# Patient Record
Sex: MALE | Race: WHITE | NOT HISPANIC OR LATINO | URBAN - METROPOLITAN AREA
[De-identification: names, ages, dates, MRNs, and addresses within clinical notes are randomized per-mention and may not be internally consistent; named-entity substitution may affect disease eponyms.]

---

## 2017-09-30 ENCOUNTER — OFFICE VISIT (OUTPATIENT)
Dept: URGENT CARE | Facility: CLINIC | Age: 27
End: 2017-09-30

## 2017-09-30 VITALS
TEMPERATURE: 97 F | OXYGEN SATURATION: 99 % | RESPIRATION RATE: 12 BRPM | HEART RATE: 68 BPM | BODY MASS INDEX: 24.87 KG/M2 | SYSTOLIC BLOOD PRESSURE: 137 MMHG | WEIGHT: 200 LBS | HEIGHT: 75 IN | DIASTOLIC BLOOD PRESSURE: 83 MMHG

## 2017-09-30 DIAGNOSIS — J02.9 SORE THROAT: Primary | ICD-10-CM

## 2017-09-30 LAB
CTP QC/QA: YES
S PYO RRNA THROAT QL PROBE: NEGATIVE

## 2017-09-30 PROCEDURE — 3008F BODY MASS INDEX DOCD: CPT | Mod: S$GLB,,, | Performed by: FAMILY MEDICINE

## 2017-09-30 PROCEDURE — 87880 STREP A ASSAY W/OPTIC: CPT | Mod: QW,S$GLB,, | Performed by: FAMILY MEDICINE

## 2017-09-30 PROCEDURE — 99202 OFFICE O/P NEW SF 15 MIN: CPT | Mod: S$GLB,,, | Performed by: FAMILY MEDICINE

## 2017-09-30 RX ORDER — BENZONATATE 100 MG/1
100 CAPSULE ORAL EVERY 6 HOURS PRN
Qty: 30 CAPSULE | Refills: 1 | Status: SHIPPED | OUTPATIENT
Start: 2017-09-30 | End: 2018-09-30

## 2017-09-30 NOTE — PATIENT INSTRUCTIONS
Pharyngite virale (mal de gorge)    Vous (ou votre enfant, si le patient est votre enfant) avez une pharyngite (un mal de gorge). Cette infection est causée par un virus. Birgit peut causer une douleur à la gorge vidya est plus intense quand vous avalez, tim courbatures partout, un mal de tête, et de la fièvre. L'infection peut se répandre par la toux, par les baisers, ou en touchant d'autres personnes après avoir touché votre bouche ou votre grant. Les antibiotiques sont sans effets contre les virus, donc ils ne sont pas utilisés pour traiter cette maladie.  Soins à domicile  · Si marvin symptômes sont sévères, reposez-vous chez vous. Retournez au travail ou à l'école quand vous sentez suffisamment rizwana.   · Buvez aussi en grandes quantités pour éviter la déshydratation.  · Pour les enfants : Utilisez un acétaminophène pour la fièvre, le comportement difficile ou l'inconfort. Avec les nouveau-nés âgés de plus de 6 mois, vous pouvez utiliser de l'ibuprofène au lieu de l'acétaminophène. (REMARQUE : Si votre enfant a une maladie chronique du foie ou du rein, ou s'il a déjà eu un ulcère à l'estomac ou un saignement gastro-intestinal, parlez avec votre médecin zach d'utiliser dave médicaments.) (REMARQUE : Ne jamais izaiah d'aspirine à une personne âgée de moins de 18 ans vidya est malade et vidya a de la fièvre. Rober pourrait causer tim dommages graves au foie.)   · Pour les adultes : Vous pouvez utiliser de l'acétaminophène ou de l'ibuprofène pour contrôler la douleur et la fièvre, à moins qu'un autre médicament ait été prescrit pour rober. (REMARQUE : si vous avez une maladie chronique du foie ou tim reins, ou si vous avez déjà eu un ulcère à l'estomac ou un saignement gastro-intestinal, parlez avec votre médecin zach de prendre dave médicaments.)  · Les pastilles pour la gorge ou les sprays anesthésiques pour la gorge peuvent aider à soulager la douleur. Le fait de vous gargariser avec de l'eau chaude salée vous aidera  aussi à réduire la douleur dans votre gorge. Pour sai, faites fondre 1/2 cuillère à thé de tan dans un verre d'eau chaude. Pour apaiser un mal de gorge, les enfants peuvent boire un jus à petites gorgées ou manger une glace à l'eau. Les enfants âgés de 5 ans ou plus peuvent également manger un suçon ou un bonbon dur.  · Évitez les aliments salés ou épicés, vidya peuvent être irritants pour la gorge.  Soins de suivi  Réalisez le suivi avec votre fournisseur de soins de santé ou notre personnel si votre état ne s'améliore pas au cours de la semaine suivante.  Quand devez-vous chercher à obtenir un sotero médical  Appelez votre fournisseur de soins de santé immédiatement si ce vidya suit se produit :  · Degré de fièvre indiqué par votre médecin.  Pour les enfants, obtenez tim soins :  ¨ Votre enfant de tout âge a tim fièvres à répétition supérieures à 104°F (40°C).  ¨ Votre enfant a moins de 2 ans et a une fièvre de 100,4 °F (38 °C) vidya perdure pendant plus d'un jour.  ¨ Votre enfant a 2 ans ou plus et a une fièvre de 100,4 °F (38 °C) vidya perdure pendant plus de 3 jours.  · Douleur à l'oreille, au sinus ou migraine nouvelle ou vidya s'aggrave  · Bosses douloureuses dans la nuque  · Raideur dans le cou  · Ganglions lymphatiques vidya augmentent de volume  · Incapacité à avaler tim liquides, bave excessive, ou incapacité à ouvrir grand la bouche en raison de la douleur à la gorge  · Signes de déshydratation (urine très foncée ou absence d'urine, yeux enfoncés, étourdissement)  · Difficulté pour respirer ou respiration bruyante  · Voix sourde  · Nouveau rash  · L'enfant semble devenir de plus en plus malade  Date Last Reviewed: 4/13/2015  © 5851-4495 [employer]. [address] [place] [id number]. All rights reserved. This information is not intended as a substitute for professional medical care. Always follow your healthcare professional's instructions.

## 2017-09-30 NOTE — PROGRESS NOTES
"Subjective:       Patient ID: Hector Matute is a 27 y.o. male.    Vitals:  height is 6' 2.5" (1.892 m) and weight is 90.7 kg (200 lb). His temperature is 97 °F (36.1 °C). His blood pressure is 137/83 and his pulse is 68. His respiration is 12 and oxygen saturation is 99%.     Chief Complaint: Sore Throat    Although pt has tried several OTC drugs he is not getting any relief.      Sore Throat    This is a new problem. The current episode started in the past 7 days. The problem has been gradually worsening. Neither side of throat is experiencing more pain than the other. There has been no fever. The pain is at a severity of 9/10. The pain is severe. Associated symptoms include ear pain, swollen glands and trouble swallowing. Pertinent negatives include no abdominal pain, congestion, coughing, headaches, hoarse voice, shortness of breath or stridor. He has had no exposure to strep or mono. He has tried gargles, NSAIDs and acetaminophen for the symptoms. The treatment provided no relief.     Review of Systems   Constitution: Negative for chills, fever and malaise/fatigue.   HENT: Positive for ear pain, sore throat and trouble swallowing. Negative for congestion, hoarse voice and stridor.    Eyes: Negative for discharge and redness.   Cardiovascular: Negative for chest pain, dyspnea on exertion and leg swelling.   Respiratory: Negative for cough, shortness of breath, sputum production and wheezing.    Musculoskeletal: Negative for myalgias.   Gastrointestinal: Negative for abdominal pain and nausea.   Neurological: Negative for headaches.       Objective:      Physical Exam   Constitutional: He appears well-developed and well-nourished.   Cardiovascular: Normal rate, regular rhythm and normal heart sounds.    Nursing note and vitals reviewed.            Results for orders placed or performed in visit on 09/30/17   POCT rapid strep A   Result Value Ref Range    Rapid Strep A Screen Negative Negative     " Acceptable Yes      Assessment:       1. Sore throat        Plan:         Sore throat  -     POCT rapid strep A    Discussed OTC analgesia